# Patient Record
Sex: FEMALE | Race: OTHER | Employment: UNEMPLOYED | URBAN - METROPOLITAN AREA
[De-identification: names, ages, dates, MRNs, and addresses within clinical notes are randomized per-mention and may not be internally consistent; named-entity substitution may affect disease eponyms.]

---

## 2022-09-16 ENCOUNTER — OFFICE VISIT (OUTPATIENT)
Dept: FAMILY MEDICINE CLINIC | Age: 22
End: 2022-09-16

## 2022-09-16 VITALS
OXYGEN SATURATION: 99 % | WEIGHT: 221 LBS | BODY MASS INDEX: 47.68 KG/M2 | RESPIRATION RATE: 16 BRPM | TEMPERATURE: 97.5 F | SYSTOLIC BLOOD PRESSURE: 140 MMHG | DIASTOLIC BLOOD PRESSURE: 90 MMHG | HEART RATE: 79 BPM | HEIGHT: 57 IN

## 2022-09-16 DIAGNOSIS — I10 PRIMARY HYPERTENSION: Primary | ICD-10-CM

## 2022-09-16 DIAGNOSIS — Z60.3 IMMIGRANT WITH LANGUAGE DIFFICULTY: ICD-10-CM

## 2022-09-16 DIAGNOSIS — Z59.89 UNINSURED: ICD-10-CM

## 2022-09-16 DIAGNOSIS — E66.01 CLASS 3 SEVERE OBESITY WITHOUT SERIOUS COMORBIDITY WITH BODY MASS INDEX (BMI) OF 45.0 TO 49.9 IN ADULT, UNSPECIFIED OBESITY TYPE (HCC): ICD-10-CM

## 2022-09-16 DIAGNOSIS — R73.9 ELEVATED BLOOD SUGAR: ICD-10-CM

## 2022-09-16 DIAGNOSIS — Z59.9 FINANCIAL DIFFICULTIES: ICD-10-CM

## 2022-09-16 PROBLEM — O09.899 MATERNAL VARICELLA, NON-IMMUNE: Status: ACTIVE | Noted: 2021-12-21

## 2022-09-16 PROBLEM — O99.019 ANEMIA AFFECTING FIRST PREGNANCY: Status: ACTIVE | Noted: 2022-02-08

## 2022-09-16 PROBLEM — O98.812 CHLAMYDIA INFECTION AFFECTING PREGNANCY IN SECOND TRIMESTER: Status: ACTIVE | Noted: 2021-12-25

## 2022-09-16 PROBLEM — Z28.39 MATERNAL VARICELLA, NON-IMMUNE: Status: ACTIVE | Noted: 2021-12-21

## 2022-09-16 PROBLEM — O11.9 CHRONIC HYPERTENSION WITH SUPERIMPOSED PRE-ECLAMPSIA: Status: ACTIVE | Noted: 2021-12-21

## 2022-09-16 PROBLEM — O09.90 HIGH RISK PREGNANCY, ANTEPARTUM: Status: ACTIVE | Noted: 2021-12-28

## 2022-09-16 PROBLEM — G51.0 BELL'S PALSY: Status: ACTIVE | Noted: 2022-05-16

## 2022-09-16 PROBLEM — O99.212 OBESITY AFFECTING PREGNANCY IN SECOND TRIMESTER: Status: ACTIVE | Noted: 2022-02-03

## 2022-09-16 PROBLEM — O26.899 HEADACHE IN PREGNANCY: Status: ACTIVE | Noted: 2022-01-11

## 2022-09-16 PROBLEM — A74.9 CHLAMYDIA INFECTION AFFECTING PREGNANCY IN SECOND TRIMESTER: Status: ACTIVE | Noted: 2021-12-25

## 2022-09-16 PROBLEM — R51.9 HEADACHE IN PREGNANCY: Status: ACTIVE | Noted: 2022-01-11

## 2022-09-16 PROBLEM — O12.12 PROTEINURIA AFFECTING PREGNANCY IN SECOND TRIMESTER: Status: ACTIVE | Noted: 2022-01-31

## 2022-09-16 LAB — GLUCOSE, POC: 147 MG/DL

## 2022-09-16 PROCEDURE — 82947 ASSAY GLUCOSE BLOOD QUANT: CPT | Performed by: NURSE PRACTITIONER

## 2022-09-16 PROCEDURE — 99203 OFFICE O/P NEW LOW 30 MIN: CPT | Performed by: NURSE PRACTITIONER

## 2022-09-16 RX ORDER — LISINOPRIL 10 MG/1
10 TABLET ORAL DAILY
Qty: 30 TABLET | Refills: 0 | Status: SHIPPED | OUTPATIENT
Start: 2022-09-16 | End: 2022-10-24 | Stop reason: SDUPTHER

## 2022-09-16 RX ORDER — LABETALOL 200 MG/1
200 TABLET, FILM COATED ORAL EVERY 12 HOURS SCHEDULED
Qty: 30 TABLET | Refills: 0 | Status: SHIPPED | OUTPATIENT
Start: 2022-09-16 | End: 2022-10-24 | Stop reason: SDUPTHER

## 2022-09-16 RX ORDER — VALACYCLOVIR HYDROCHLORIDE 1 G/1
1000 TABLET, FILM COATED ORAL 3 TIMES DAILY
COMMUNITY
Start: 2022-05-21

## 2022-09-16 RX ORDER — LABETALOL 200 MG/1
400 TABLET, FILM COATED ORAL EVERY 12 HOURS SCHEDULED
COMMUNITY
Start: 2022-05-21 | End: 2022-09-16 | Stop reason: SDUPTHER

## 2022-09-16 SDOH — ECONOMIC STABILITY - INCOME SECURITY: PROBLEM RELATED TO HOUSING AND ECONOMIC CIRCUMSTANCES, UNSPECIFIED: Z59.9

## 2022-09-16 SDOH — SOCIAL STABILITY - SOCIAL INSECURITY: ACCULTURATION DIFFICULTY: Z60.3

## 2022-09-16 SDOH — ECONOMIC STABILITY - INCOME SECURITY: OTHER PROBLEMS RELATED TO HOUSING AND ECONOMIC CIRCUMSTANCES: Z59.89

## 2022-09-16 ASSESSMENT — PATIENT HEALTH QUESTIONNAIRE - PHQ9
SUM OF ALL RESPONSES TO PHQ9 QUESTIONS 1 & 2: 0
2. FEELING DOWN, DEPRESSED OR HOPELESS: 0
SUM OF ALL RESPONSES TO PHQ QUESTIONS 1-9: 0
1. LITTLE INTEREST OR PLEASURE IN DOING THINGS: 0
SUM OF ALL RESPONSES TO PHQ QUESTIONS 1-9: 0

## 2022-09-16 ASSESSMENT — ANXIETY QUESTIONNAIRES
GAD7 TOTAL SCORE: 0
4. TROUBLE RELAXING: 0
3. WORRYING TOO MUCH ABOUT DIFFERENT THINGS: 0
5. BEING SO RESTLESS THAT IT IS HARD TO SIT STILL: 0
2. NOT BEING ABLE TO STOP OR CONTROL WORRYING: 0
7. FEELING AFRAID AS IF SOMETHING AWFUL MIGHT HAPPEN: 0
1. FEELING NERVOUS, ANXIOUS, OR ON EDGE: 0
IF YOU CHECKED OFF ANY PROBLEMS ON THIS QUESTIONNAIRE, HOW DIFFICULT HAVE THESE PROBLEMS MADE IT FOR YOU TO DO YOUR WORK, TAKE CARE OF THINGS AT HOME, OR GET ALONG WITH OTHER PEOPLE: NOT DIFFICULT AT ALL
6. BECOMING EASILY ANNOYED OR IRRITABLE: 0

## 2022-09-16 ASSESSMENT — LIFESTYLE VARIABLES
HOW OFTEN DO YOU HAVE A DRINK CONTAINING ALCOHOL: NEVER
HOW MANY STANDARD DRINKS CONTAINING ALCOHOL DO YOU HAVE ON A TYPICAL DAY: PATIENT DOES NOT DRINK

## 2022-09-17 ASSESSMENT — ENCOUNTER SYMPTOMS
CHEST TIGHTNESS: 0
SHORTNESS OF BREATH: 0

## 2022-09-17 NOTE — PROGRESS NOTES
[address] [facility] (:  2000) is a 25 y.o. female,New patient, here for evaluation of the following chief complaint(s):  Hypertension (Uncontrolled BP) and Diabetes (Sugar check)        SUBJECTIVE/OBJECTIVE:    HPI:  The 25 y.o female patient Malaysian speaking English presents in [address] [place] [address] for blood pressure check and sugar check. Patient reports had baby in May 2022. During pregnancy had elevated blood pressure and was treated with Nifedipine and Labetalol. Patient continues taking this medication until ran out few months ago. Patient experience at times mild headache. Patient reports not breast feeding. Patient is obese, and said was over weight she was on [facility]. Patient reports the over weight affected the pregnancy as well as Anemia. Patient arrived with /115 and denies headache, chest pain or weakness, or sweating. Rechecked /90. Ordered BS resulted 147. We discuss new BP medication with the Labetalol taking in the evening. BP (!) 140/90   Pulse 79   Temp 97.5 °F (36.4 °C) (Temporal)   Resp 16   Ht 4' 9\" (1.448 m)   Wt 221 lb (100.2 kg)   SpO2 99%   Breastfeeding No   BMI 47.82 kg/m²     No results found for: CBC, CMP, LIPIDPAN, TSH, HBA1C     Not on File    Current Outpatient Medications   Medication Sig Dispense Refill    valACYclovir (VALTREX) 1 g tablet Take 1,000 mg by mouth 3 times daily      labetalol (NORMODYNE) 200 MG tablet Take 1 tablet by mouth every 12 hours 30 tablet 0    lisinopril (PRINIVIL;ZESTRIL) 10 MG tablet Take 1 tablet by mouth daily 30 tablet 0     No current facility-administered medications for this visit. History reviewed. No pertinent past medical history. History reviewed. No pertinent surgical history. Review of Systems   Constitutional:  Negative for fatigue. HENT:  Negative for congestion. Respiratory:  Negative for chest tightness and shortness of breath. Cardiovascular:  Negative for chest pain.      Physical Exam  Vitals reviewed. Constitutional:       Appearance: She is obese. HENT:      Head: Normocephalic. Nose: Nose normal.   Cardiovascular:      Heart sounds: Normal heart sounds. Pulmonary:      Effort: Pulmonary effort is normal.      Breath sounds: Normal breath sounds. Musculoskeletal:      Cervical back: Normal range of motion. Neurological:      Mental Status: She is alert and oriented to person, place, and time. Psychiatric:         Mood and Affect: Mood normal.         Behavior: Behavior normal.         Thought Content: Thought content normal.         Judgment: Judgment normal.         ASSESSMENT/PLAN:  Restart Labetalol 200 mg take in the evening. Start patient with Lisinopril 10 mg take in the morning. Discussed lifestyle modification, daily walking, cutting down on salt. Discussed DASH diet. I recommended patient follow up in 4 weeks for another BP refill   at this visit. The goal here is to control her BP and reduce weight. 1. Primary hypertension  -     labetalol (NORMODYNE) 200 MG tablet; Take 1 tablet by mouth every 12 hours, Disp-30 tablet, R-0Print  -     lisinopril (PRINIVIL;ZESTRIL) 10 MG tablet; Take 1 tablet by mouth daily, Disp-30 tablet, R-0Print  2. Elevated blood sugar  -     AMB POC GLUCOSE, QUANTITATIVE, BLOOD  3. Uninsured  4. Immigrant with language difficulty  5. Financial difficulties  6. Class 3 severe obesity without serious comorbidity with body mass index (BMI) of 45.0 to 49.9 in adult, unspecified obesity type ([facility]      Return in about 4 weeks (around 10/14/2022) for F/U BP. On this date 9/16/2022 I have spent 30 minutes reviewing previous notes, test results and face to face with the patient discussing the diagnosis and importance of compliance with the treatment plan as well as documenting on the day of the visit. An electronic signature was used to authenticate this note.     --Ingrid Odom, GENE - CNP

## 2022-10-24 ENCOUNTER — OFFICE VISIT (OUTPATIENT)
Dept: PRIMARY CARE CLINIC | Facility: CLINIC | Age: 22
End: 2022-10-24

## 2022-10-24 VITALS
RESPIRATION RATE: 16 BRPM | HEIGHT: 57 IN | SYSTOLIC BLOOD PRESSURE: 140 MMHG | HEART RATE: 74 BPM | DIASTOLIC BLOOD PRESSURE: 82 MMHG | WEIGHT: 228.8 LBS | OXYGEN SATURATION: 99 % | TEMPERATURE: 97.3 F | BODY MASS INDEX: 49.36 KG/M2

## 2022-10-24 DIAGNOSIS — I10 PRIMARY HYPERTENSION: ICD-10-CM

## 2022-10-24 DIAGNOSIS — R73.9 HYPERGLYCEMIA: Primary | ICD-10-CM

## 2022-10-24 PROBLEM — O09.90 HIGH RISK PREGNANCY, ANTEPARTUM: Status: RESOLVED | Noted: 2021-12-28 | Resolved: 2022-10-24

## 2022-10-24 PROBLEM — O98.812 CHLAMYDIA INFECTION AFFECTING PREGNANCY IN SECOND TRIMESTER: Status: RESOLVED | Noted: 2021-12-25 | Resolved: 2022-10-24

## 2022-10-24 PROBLEM — Z28.39 MATERNAL VARICELLA, NON-IMMUNE: Status: RESOLVED | Noted: 2021-12-21 | Resolved: 2022-10-24

## 2022-10-24 PROBLEM — O11.9 CHRONIC HYPERTENSION WITH SUPERIMPOSED PRE-ECLAMPSIA: Status: RESOLVED | Noted: 2021-12-21 | Resolved: 2022-10-24

## 2022-10-24 PROBLEM — A74.9 CHLAMYDIA INFECTION AFFECTING PREGNANCY IN SECOND TRIMESTER: Status: RESOLVED | Noted: 2021-12-25 | Resolved: 2022-10-24

## 2022-10-24 PROBLEM — O09.899 MATERNAL VARICELLA, NON-IMMUNE: Status: RESOLVED | Noted: 2021-12-21 | Resolved: 2022-10-24

## 2022-10-24 PROBLEM — G51.0 BELL'S PALSY: Status: RESOLVED | Noted: 2022-05-16 | Resolved: 2022-10-24

## 2022-10-24 PROBLEM — R51.9 HEADACHE IN PREGNANCY: Status: RESOLVED | Noted: 2022-01-11 | Resolved: 2022-10-24

## 2022-10-24 PROBLEM — O12.12 PROTEINURIA AFFECTING PREGNANCY IN SECOND TRIMESTER: Status: RESOLVED | Noted: 2022-01-31 | Resolved: 2022-10-24

## 2022-10-24 PROBLEM — O99.019 ANEMIA AFFECTING FIRST PREGNANCY: Status: RESOLVED | Noted: 2022-02-08 | Resolved: 2022-10-24

## 2022-10-24 PROBLEM — O26.899 HEADACHE IN PREGNANCY: Status: RESOLVED | Noted: 2022-01-11 | Resolved: 2022-10-24

## 2022-10-24 PROBLEM — O99.212 OBESITY AFFECTING PREGNANCY IN SECOND TRIMESTER: Status: RESOLVED | Noted: 2022-02-03 | Resolved: 2022-10-24

## 2022-10-24 PROCEDURE — 99214 OFFICE O/P EST MOD 30 MIN: CPT | Performed by: PHYSICIAN ASSISTANT

## 2022-10-24 RX ORDER — LISINOPRIL 10 MG/1
10 TABLET ORAL DAILY
Qty: 30 TABLET | Refills: 3 | Status: SHIPPED | OUTPATIENT
Start: 2022-10-24 | End: 2022-11-15 | Stop reason: HOSPADM

## 2022-10-24 RX ORDER — LABETALOL 200 MG/1
200 TABLET, FILM COATED ORAL DAILY
Qty: 30 TABLET | Refills: 3 | Status: SHIPPED | OUTPATIENT
Start: 2022-10-24 | End: 2022-11-23

## 2022-10-24 ASSESSMENT — ENCOUNTER SYMPTOMS
CONSTIPATION: 0
EYE PAIN: 0
DIARRHEA: 0
VOMITING: 0
SORE THROAT: 0
SHORTNESS OF BREATH: 0
ABDOMINAL PAIN: 0

## 2022-10-24 ASSESSMENT — PATIENT HEALTH QUESTIONNAIRE - PHQ9
1. LITTLE INTEREST OR PLEASURE IN DOING THINGS: 0
2. FEELING DOWN, DEPRESSED OR HOPELESS: 0
SUM OF ALL RESPONSES TO PHQ QUESTIONS 1-9: 0
SUM OF ALL RESPONSES TO PHQ QUESTIONS 1-9: 0
SUM OF ALL RESPONSES TO PHQ9 QUESTIONS 1 & 2: 0
SUM OF ALL RESPONSES TO PHQ QUESTIONS 1-9: 0
SUM OF ALL RESPONSES TO PHQ QUESTIONS 1-9: 0

## 2022-10-24 NOTE — PROGRESS NOTES
[address] [facility] (:  2000) is a 25 y.o. female here for evaluation of the following chief complaint(s):  Blood Pressure Check, Medication Refill, and Establish Care         ASSESSMENT/PLAN:  1. Hyperglycemia  Comments: Will check A1C before next visit   Orders:  -     Hemoglobin A1C; Future  2. Primary hypertension  Comments:  COntinue labetalol + lisinopril daily. Recheck 4 weeks. If controlled can probably d/c lisinopril and increase labetalol to BID if needed   Orders:  -     lisinopril (PRINIVIL;ZESTRIL) 10 MG tablet; Take 1 tablet by mouth daily, Disp-30 tablet, R-3Normal  -     labetalol (NORMODYNE) 200 MG tablet; Take 1 tablet by mouth daily, Disp-30 tablet, R-3Normal  -     CBC with Auto Differential; Future  -     Comprehensive Metabolic Panel; Future      No results found for any visits on 10/24/22. Return in about 1 month (around 2022) for blood pressure check and review labs , blood pressure check. Subjective   SUBJECTIVE/OBJECTIVE:  Pt here for BP recheck and med refills. Pt reports compliance with labetalol and lisinopril. Rx for labetalol says to take BID but patient says she was told to just take once per day in the evening so that is what she has been doing. She denies headaches, dizziness, visual disturbances, chest pains or SOB. She has history of gestational diabetes and no A1C has been done since she delivered her baby about 6 months ago. Blood sugar was checked last month and was 147. She is wondering if she needs diabetes screening     Medication Refill  Pertinent negatives include no abdominal pain, chest pain, chills, congestion, fatigue, fever, headaches, rash, sore throat, vomiting or weakness.      No Known Allergies  Current Outpatient Medications   Medication Sig Dispense Refill    lisinopril (PRINIVIL;ZESTRIL) 10 MG tablet Take 1 tablet by mouth daily 30 tablet 3    labetalol (NORMODYNE) 200 MG tablet Take 1 tablet by mouth daily 30 tablet 3 valACYclovir (VALTREX) 1 g tablet Take 1,000 mg by mouth 3 times daily (Patient not taking: No sig reported)       No current facility-administered medications for this visit. Past Medical History:   Diagnosis Date    Anemia affecting first pregnancy 2022    Formatting of this note might be different from the original. Prescribed iron  Prev. Taking p.o iron, ran out of prescription, refills sent 3/30 Plan CBC Q4-6w  Anemia panel completed. Patient declines Infed or infusions, will continue with oral iron BID  Last Assessment & Plan:  Formatting of this note might be different from the original. Continue PO iron    Bell's palsy 2022    Last Assessment & Plan:  Formatting of this note might be different from the original. Presented with facial droop and slurred speech  Stroke alert called and work up negative CT negative MRI Subcentimeter focus of signal abnormality / calcification in the right parietal lobe at the gray-white matter junction. Primary differential diagnosis is centered around sequelae of prior insult / infection v    Chlamydia infection affecting pregnancy in second trimester 2021    Formatting of this note might be different from the original. 21: needs rx 21: rx sent 22 Pt has still not gotten medication. States work makes it difficult to get to pharmacy. Further questioning reveals she has not yet gotten her medicaid and has been trying to call their office. Pt given [facility] card and advised she and her partner can go there for free. Reviewed increased    Chronic hypertension with superimposed pre-eclampsia 2021    Formatting of this note might be different from the original. Current regimen: Procardia XL 60 / 30 mg increased on                                  Baby  mg po daily  Serial growth @ 28w Twice weekly  testing @ 32w -plan NSTs (self-pay) Del @ 37-39w : EFW 63%, breech  Superimposed preeclampsia diagnosed  after PC 1.5 Plan delivery at 37 weeks - counseled    Last Ass    Headache in pregnancy 2022    Last Assessment & Plan:  Formatting of this note might be different from the original. Denies HA today    High risk pregnancy, antepartum 2021    Formatting of this note might be different from the original. ABO/Rh: O pos HIV/Hep B/Hep C/RPR: NR/neg Rubella imm GC/C +chlamydia, treatment sent , HELENA negative cffDNA low risk female AFP negative Glucola #1 elevated, 3hr gtt wnl Glucola #2 3hr gtt wnl GBS_______ COVID vaccine , 3/15 Flu vaccine  Tdap 3/30  Last Assessment & Plan:  Formatting of this note might be different from the o    Hypertension     Maternal varicella, non-immune 2021    Formatting of this note might be different from the original. 21: plan postpartum vaccination, pt aware  Last Assessment & Plan:  Formatting of this note might be different from the original. Plan MMR PP    Obesity affecting pregnancy in second trimester 2/3/2022    Formatting of this note might be different from the original. Pregravid BMI: 45 Growth &  testing according to [facility] guidelines  Last Assessment & Plan:  Formatting of this note might be different from the original. Pregravid BMI: 45 Growth &  testing according to [facility] guidelines     Past Surgical History:   Procedure Laterality Date     SECTION       Social History     Tobacco Use    Smoking status: Never    Smokeless tobacco: Never   Vaping Use    Vaping Use: Never used   Substance Use Topics    Alcohol use: Never    Drug use: Never      History reviewed. No pertinent family history. Review of Systems   Constitutional:  Negative for chills, fatigue, fever and unexpected weight change. HENT:  Negative for congestion and sore throat. Eyes:  Negative for pain. Respiratory:  Negative for shortness of breath. Cardiovascular:  Negative for chest pain.    Gastrointestinal:  Negative for abdominal pain, constipation, diarrhea and vomiting. Genitourinary:  Negative for dysuria. Skin:  Negative for rash. Allergic/Immunologic: Negative for immunocompromised state. Neurological:  Negative for dizziness, weakness and headaches. Objective     Vitals:    10/24/22 1015   BP: (!) 140/82   Pulse: 74   Resp: 16   Temp: 97.3 °F (36.3 °C)   SpO2: 99%       Physical Exam  Vitals reviewed. Constitutional:       Appearance: Normal appearance. HENT:      Head: Normocephalic and atraumatic. Eyes:      Extraocular Movements: Extraocular movements intact. Pupils: Pupils are equal, round, and reactive to light. Cardiovascular:      Rate and Rhythm: Normal rate and regular rhythm. Pulses: Normal pulses. Heart sounds: Normal heart sounds. Pulmonary:      Effort: Pulmonary effort is normal.      Breath sounds: Normal breath sounds. Musculoskeletal:         General: Normal range of motion. Cervical back: Normal range of motion and neck supple. Skin:     General: Skin is warm and dry. Neurological:      General: No focal deficit present. Mental Status: She is alert and oriented to person, place, and time. Psychiatric:         Mood and Affect: Mood normal.         Behavior: Behavior normal.         Judgment: Judgment normal.                An electronic signature was used to authenticate this note.     --SURESH Gibson

## 2022-11-10 DIAGNOSIS — R73.9 HYPERGLYCEMIA: ICD-10-CM

## 2022-11-10 DIAGNOSIS — I10 PRIMARY HYPERTENSION: ICD-10-CM

## 2022-11-10 LAB
ALBUMIN SERPL-MCNC: 3.5 G/DL (ref 3.5–5)
ALBUMIN/GLOB SERPL: 0.8 {RATIO} (ref 0.4–1.6)
ALP SERPL-CCNC: 81 U/L (ref 50–136)
ALT SERPL-CCNC: 130 U/L (ref 12–65)
ANION GAP SERPL CALC-SCNC: 7 MMOL/L (ref 2–11)
AST SERPL-CCNC: 83 U/L (ref 15–37)
BASOPHILS # BLD: 0.1 K/UL (ref 0–0.2)
BASOPHILS NFR BLD: 1 % (ref 0–2)
BILIRUB SERPL-MCNC: 0.3 MG/DL (ref 0.2–1.1)
BUN SERPL-MCNC: 14 MG/DL (ref 6–23)
CALCIUM SERPL-MCNC: 9.3 MG/DL (ref 8.3–10.4)
CHLORIDE SERPL-SCNC: 103 MMOL/L (ref 101–110)
CO2 SERPL-SCNC: 26 MMOL/L (ref 21–32)
CREAT SERPL-MCNC: 0.6 MG/DL (ref 0.6–1)
DIFFERENTIAL METHOD BLD: ABNORMAL
EOSINOPHIL # BLD: 0.4 K/UL (ref 0–0.8)
EOSINOPHIL NFR BLD: 6 % (ref 0.5–7.8)
ERYTHROCYTE [DISTWIDTH] IN BLOOD BY AUTOMATED COUNT: 18.7 % (ref 11.9–14.6)
EST. AVERAGE GLUCOSE BLD GHB EST-MCNC: 163 MG/DL
GLOBULIN SER CALC-MCNC: 4.6 G/DL (ref 2.8–4.5)
GLUCOSE SERPL-MCNC: 272 MG/DL (ref 65–100)
HBA1C MFR BLD: 7.3 % (ref 4.8–5.6)
HCT VFR BLD AUTO: 36.9 % (ref 35.8–46.3)
HGB BLD-MCNC: 11.8 G/DL (ref 11.7–15.4)
IMM GRANULOCYTES # BLD AUTO: 0 K/UL (ref 0–0.5)
IMM GRANULOCYTES NFR BLD AUTO: 0 % (ref 0–5)
LYMPHOCYTES # BLD: 2.6 K/UL (ref 0.5–4.6)
LYMPHOCYTES NFR BLD: 34 % (ref 13–44)
MCH RBC QN AUTO: 23.5 PG (ref 26.1–32.9)
MCHC RBC AUTO-ENTMCNC: 32 G/DL (ref 31.4–35)
MCV RBC AUTO: 73.4 FL (ref 82–102)
MONOCYTES # BLD: 0.5 K/UL (ref 0.1–1.3)
MONOCYTES NFR BLD: 6 % (ref 4–12)
NEUTS SEG # BLD: 4.1 K/UL (ref 1.7–8.2)
NEUTS SEG NFR BLD: 53 % (ref 43–78)
NRBC # BLD: 0 K/UL (ref 0–0.2)
PLATELET # BLD AUTO: 343 K/UL (ref 150–450)
PMV BLD AUTO: 10.8 FL (ref 9.4–12.3)
POTASSIUM SERPL-SCNC: 3.9 MMOL/L (ref 3.5–5.1)
PROT SERPL-MCNC: 8.1 G/DL (ref 6.3–8.2)
RBC # BLD AUTO: 5.03 M/UL (ref 4.05–5.2)
SODIUM SERPL-SCNC: 136 MMOL/L (ref 133–143)
WBC # BLD AUTO: 7.7 K/UL (ref 4.3–11.1)

## 2022-11-15 ENCOUNTER — OFFICE VISIT (OUTPATIENT)
Dept: PRIMARY CARE CLINIC | Facility: CLINIC | Age: 22
End: 2022-11-15

## 2022-11-15 VITALS
RESPIRATION RATE: 16 BRPM | HEART RATE: 76 BPM | OXYGEN SATURATION: 98 % | DIASTOLIC BLOOD PRESSURE: 68 MMHG | BODY MASS INDEX: 45.76 KG/M2 | SYSTOLIC BLOOD PRESSURE: 102 MMHG | TEMPERATURE: 97.6 F | HEIGHT: 59 IN | WEIGHT: 227 LBS

## 2022-11-15 DIAGNOSIS — I10 PRIMARY HYPERTENSION: ICD-10-CM

## 2022-11-15 DIAGNOSIS — R73.09 ELEVATED HEMOGLOBIN A1C: Primary | ICD-10-CM

## 2022-11-15 DIAGNOSIS — R74.8 ELEVATED LIVER ENZYMES: ICD-10-CM

## 2022-11-15 LAB — GLUCOSE, POC: 188 MG/DL

## 2022-11-15 PROCEDURE — 82962 GLUCOSE BLOOD TEST: CPT | Performed by: PHYSICIAN ASSISTANT

## 2022-11-15 PROCEDURE — 3074F SYST BP LT 130 MM HG: CPT | Performed by: PHYSICIAN ASSISTANT

## 2022-11-15 PROCEDURE — 99214 OFFICE O/P EST MOD 30 MIN: CPT | Performed by: PHYSICIAN ASSISTANT

## 2022-11-15 PROCEDURE — 3078F DIAST BP <80 MM HG: CPT | Performed by: PHYSICIAN ASSISTANT

## 2022-11-15 RX ORDER — BLOOD-GLUCOSE METER
1 KIT MISCELLANEOUS DAILY
Qty: 1 KIT | Refills: 0 | Status: SHIPPED | OUTPATIENT
Start: 2022-11-15

## 2022-11-15 RX ORDER — METFORMIN HYDROCHLORIDE 500 MG/1
500 TABLET, EXTENDED RELEASE ORAL
Qty: 90 TABLET | Refills: 1 | Status: SHIPPED | OUTPATIENT
Start: 2022-11-15

## 2022-11-15 SDOH — ECONOMIC STABILITY: FOOD INSECURITY: WITHIN THE PAST 12 MONTHS, YOU WORRIED THAT YOUR FOOD WOULD RUN OUT BEFORE YOU GOT MONEY TO BUY MORE.: NEVER TRUE

## 2022-11-15 SDOH — ECONOMIC STABILITY: INCOME INSECURITY: IN THE LAST 12 MONTHS, WAS THERE A TIME WHEN YOU WERE NOT ABLE TO PAY THE MORTGAGE OR RENT ON TIME?: NO

## 2022-11-15 SDOH — ECONOMIC STABILITY: HOUSING INSECURITY
IN THE LAST 12 MONTHS, WAS THERE A TIME WHEN YOU DID NOT HAVE A STEADY PLACE TO SLEEP OR SLEPT IN A SHELTER (INCLUDING NOW)?: NO

## 2022-11-15 SDOH — ECONOMIC STABILITY: FOOD INSECURITY: WITHIN THE PAST 12 MONTHS, THE FOOD YOU BOUGHT JUST DIDN'T LAST AND YOU DIDN'T HAVE MONEY TO GET MORE.: NEVER TRUE

## 2022-11-15 ASSESSMENT — ENCOUNTER SYMPTOMS
EYE PAIN: 0
CONSTIPATION: 0
VOMITING: 0
SHORTNESS OF BREATH: 0
DIARRHEA: 0
ABDOMINAL PAIN: 0
SORE THROAT: 0

## 2022-11-15 ASSESSMENT — SOCIAL DETERMINANTS OF HEALTH (SDOH): HOW HARD IS IT FOR YOU TO PAY FOR THE VERY BASICS LIKE FOOD, HOUSING, MEDICAL CARE, AND HEATING?: NOT HARD AT ALL

## 2022-11-15 ASSESSMENT — PATIENT HEALTH QUESTIONNAIRE - PHQ9
SUM OF ALL RESPONSES TO PHQ QUESTIONS 1-9: 0
1. LITTLE INTEREST OR PLEASURE IN DOING THINGS: 0
2. FEELING DOWN, DEPRESSED OR HOPELESS: 0
SUM OF ALL RESPONSES TO PHQ QUESTIONS 1-9: 0
SUM OF ALL RESPONSES TO PHQ QUESTIONS 1-9: 0
SUM OF ALL RESPONSES TO PHQ9 QUESTIONS 1 & 2: 0
SUM OF ALL RESPONSES TO PHQ QUESTIONS 1-9: 0

## 2022-11-15 NOTE — PROGRESS NOTES
[address] [facility] (:  2000) is a 25 y.o. female here for evaluation of the following chief complaint(s):  1 Month Follow-Up, Hypertension, and Discuss Labs         ASSESSMENT/PLAN:  1. Elevated hemoglobin A1c  Comments:  start metformin once daily  instructed pt on checking BS at home   recheck A1C 3 months   dicussed diet/lifestyle modifications   Orders:  -     AMB POC GLUCOSE BLOOD, BY GLUCOSE MONITORING DEVICE  -     metFORMIN (GLUCOPHAGE-XR) 500 MG extended release tablet; Take 1 tablet by mouth daily (with breakfast), Disp-90 tablet, R-1Normal  -     Lipid Panel; Future  -     glucose monitoring (FREESTYLE FREEDOM) kit; DAILY Starting Tue 11/15/2022, Disp-1 kit, R-0, Normal  2. Primary hypertension  Comments:  stop lisinopril at this time and continue labetalol once daily for now. 3. Elevated liver enzymes  Comments:  recheck CMP and check lipids before next visit   Orders:  -     Comprehensive Metabolic Panel; Future  -     Lipid Panel; Future    Results for orders placed or performed in visit on 11/15/22   AMB POC GLUCOSE BLOOD, BY GLUCOSE MONITORING DEVICE   Result Value Ref Range    Glucose,  MG/DL        Return in about 4 weeks (around 2022) for labs before visit . Subjective   SUBJECTIVE/OBJECTIVE:  Pt here today for BP follow up and discuss labs. Her BP is much improved from last visit. She is compliant with labetalol and lisinopril    A1C was elevated 7.3% on recent labs. Pt does have history of gestational diabetes but never treated with meds- just diet controlled. Liver enzymes also had some elevation and that will be rechecked before next visit.        No Known Allergies  Current Outpatient Medications   Medication Sig Dispense Refill    metFORMIN (GLUCOPHAGE-XR) 500 MG extended release tablet Take 1 tablet by mouth daily (with breakfast) 90 tablet 1    glucose monitoring (FREESTYLE FREEDOM) kit 1 kit by Does not apply route daily 1 kit 0    labetalol (NORMODYNE) 200 MG tablet Take 1 tablet by mouth daily 30 tablet 3    valACYclovir (VALTREX) 1 g tablet Take 1,000 mg by mouth 3 times daily (Patient not taking: No sig reported)       No current facility-administered medications for this visit. Past Medical History:   Diagnosis Date    Anemia affecting first pregnancy 2022    Formatting of this note might be different from the original. Prescribed iron  Prev. Taking p.o iron, ran out of prescription, refills sent 3/30 Plan CBC Q4-6w  Anemia panel completed. Patient declines Infed or infusions, will continue with oral iron BID  Last Assessment & Plan:  Formatting of this note might be different from the original. Continue PO iron    Bell's palsy 2022    Last Assessment & Plan:  Formatting of this note might be different from the original. Presented with facial droop and slurred speech  Stroke alert called and work up negative CT negative MRI Subcentimeter focus of signal abnormality / calcification in the right parietal lobe at the gray-white matter junction. Primary differential diagnosis is centered around sequelae of prior insult / infection v    Chlamydia infection affecting pregnancy in second trimester 2021    Formatting of this note might be different from the original. 21: needs rx 21: rx sent 22 Pt has still not gotten medication. States work makes it difficult to get to pharmacy. Further questioning reveals she has not yet gotten her medicaid and has been trying to call their office. Pt given [facility] card and advised she and her partner can go there for free. Reviewed increased    Chronic hypertension with superimposed pre-eclampsia 2021    Formatting of this note might be different from the original. Current regimen: Procardia XL 60 / 30 mg increased on                                  Baby  mg po daily  Serial growth @ 28w Twice weekly  testing @ 32w -plan NSTs (self-pay) Del @ 37-39w : EFW 63%, breech  Superimposed preeclampsia diagnosed  after PC 1.5 Plan delivery at 37 weeks - counseled    Last Ass    Headache in pregnancy 2022    Last Assessment & Plan:  Formatting of this note might be different from the original. Denies HA today    High risk pregnancy, antepartum 2021    Formatting of this note might be different from the original. ABO/Rh: O pos HIV/Hep B/Hep C/RPR: NR/neg Rubella imm GC/C +chlamydia, treatment sent , HELENA negative cffDNA low risk female AFP negative Glucola #1 elevated, 3hr gtt wnl Glucola #2 3hr gtt wnl GBS_______ COVID vaccine , 3/15 Flu vaccine  Tdap 3/30  Last Assessment & Plan:  Formatting of this note might be different from the o    Hypertension     Maternal varicella, non-immune 2021    Formatting of this note might be different from the original. 21: plan postpartum vaccination, pt aware  Last Assessment & Plan:  Formatting of this note might be different from the original. Plan MMR PP    Obesity affecting pregnancy in second trimester 2/3/2022    Formatting of this note might be different from the original. Pregravid BMI: 45 Growth &  testing according to [facility] guidelines  Last Assessment & Plan:  Formatting of this note might be different from the original. Pregravid BMI: 45 Growth &  testing according to [facility] guidelines     Past Surgical History:   Procedure Laterality Date     SECTION       Social History     Tobacco Use    Smoking status: Never    Smokeless tobacco: Never   Vaping Use    Vaping Use: Never used   Substance Use Topics    Alcohol use: Never    Drug use: Never      History reviewed. No pertinent family history.     Comprehensive Metabolic Panel  Order: [id number]  Status: Final result    Visible to patient: Yes (seen)    Next appt: None    Dx: Primary hypertension    2 Result Notes  Component Ref Range & Units 11/10/22 1537    Sodium 133 - 143 mmol/L 136    Potassium 3.5 - 5.1 mmol/L 3.9 Chloride 101 - 110 mmol/L 103    CO2 21 - 32 mmol/L 26    Anion Gap 2 - 11 mmol/L 7    Glucose 65 - 100 mg/dL 272 High     BUN 6 - 23 MG/DL 14    Creatinine 0.6 - 1.0 MG/DL 0.60    Est, Glom Filt Rate >60 ml/min/1.73m2 >60    Comment:    Pediatric calculator link: [employer]. org/professionals/kdoqi/gfr_calculatorped     Effective Oct 3, 2022     These results are not intended for use in patients <25years of age. eGFR results are calculated without a race factor using  the 2021 CKD-EPI equation. Careful clinical correlation is recommended, particularly when comparing to results calculated using previous equations. The CKD-EPI equation is less accurate in patients with extremes of muscle mass, extra-renal metabolism of creatinine, excessive creatine ingestion, or following therapy that affects renal tubular secretion. Calcium 8.3 - 10.4 MG/DL 9.3    Total Bilirubin 0.2 - 1.1 MG/DL 0.3    ALT 12 - 65 U/L 130 High     AST 15 - 37 U/L 83 High     Alk Phosphatase 50 - 136 U/L 81    Total Protein 6.3 - 8.2 g/dL 8.1    Albumin 3.5 - 5.0 g/dL 3.5    Globulin 2.8 - 4.5 g/dL 4.6 High     Albumin/Globulin Ratio 0.4 - 1.6   0.8    Resulting Agency  [facility]              Specimen Collected: 11/10/22 15:37 EST Last Resulted: 11/10/22 18:23 EST           Hemoglobin A1C  Order: [id number]  Status: Final result    Visible to patient: Yes (seen)    Next appt: None    Dx: Hyperglycemia    2 Result Notes    1  Topic  Component Ref Range & Units 11/10/22 1537    Hemoglobin A1C 4.8 - 5.6 % 7.3 High     eAG mg/dL 163    Comment: Reference Range   Normal: 4.8-5.6   Diabetic >=6.5%   Normal       <5.7%    Resulting Agency  [facility]              Specimen Collected: 11/10/22 15:37 EST Last Resulted: 11/10/22 20:47 EST           Review of Systems   Constitutional:  Negative for chills, fatigue, fever and unexpected weight change. HENT:  Negative for congestion and sore throat. Eyes:  Negative for pain.    Respiratory: Negative for shortness of breath. Cardiovascular:  Negative for chest pain. Gastrointestinal:  Negative for abdominal pain, constipation, diarrhea and vomiting. Endocrine: Negative for cold intolerance, heat intolerance, polydipsia, polyphagia and polyuria. Genitourinary:  Negative for dysuria. Musculoskeletal:  Negative for gait problem, joint swelling and myalgias. Skin:  Negative for rash. Allergic/Immunologic: Negative for immunocompromised state. Neurological:  Negative for dizziness, weakness, light-headedness and headaches. Objective     Vitals:    11/15/22 1017   BP: 102/68   Pulse: 76   Resp: 16   Temp: 97.6 °F (36.4 °C)   SpO2: 98%       Physical Exam  Vitals reviewed. Constitutional:       Appearance: Normal appearance. HENT:      Head: Normocephalic and atraumatic. Mouth/Throat:      Mouth: Mucous membranes are moist.      Pharynx: Oropharynx is clear. Eyes:      Extraocular Movements: Extraocular movements intact. Pupils: Pupils are equal, round, and reactive to light. Cardiovascular:      Rate and Rhythm: Normal rate and regular rhythm. Pulmonary:      Effort: Pulmonary effort is normal.      Breath sounds: Normal breath sounds. Abdominal:      General: Bowel sounds are normal.      Palpations: Abdomen is soft. Musculoskeletal:         General: Normal range of motion. Cervical back: Normal range of motion and neck supple. Skin:     General: Skin is warm and dry. Neurological:      General: No focal deficit present. Mental Status: She is alert and oriented to person, place, and time. Psychiatric:         Mood and Affect: Mood normal.         Behavior: Behavior normal.         Judgment: Judgment normal.                An electronic signature was used to authenticate this note.     --SURESH Herron

## 2022-11-15 NOTE — PATIENT INSTRUCTIONS
Please STOP LISINOPRIL at this time   Continue labetalol  Start metformin for blood sugar  Follow up in 3-4 weeks with labs before that visit

## 2022-11-22 ENCOUNTER — TELEPHONE (OUTPATIENT)
Dept: PRIMARY CARE CLINIC | Facility: CLINIC | Age: 22
End: 2022-11-22

## 2022-11-22 DIAGNOSIS — R73.09 ELEVATED HEMOGLOBIN A1C: Primary | ICD-10-CM

## 2022-11-22 RX ORDER — GLUCOSAMINE HCL/CHONDROITIN SU 500-400 MG
CAPSULE ORAL
Qty: 100 STRIP | Refills: 2 | Status: SHIPPED | OUTPATIENT
Start: 2022-11-22

## 2022-11-22 RX ORDER — LANCETS 30 GAUGE
1 EACH MISCELLANEOUS DAILY
Qty: 300 EACH | Refills: 1 | Status: SHIPPED | OUTPATIENT
Start: 2022-11-22

## 2022-11-22 NOTE — TELEPHONE ENCOUNTER
Patient called and said that she picked up her medication including her glucometer but she did not get any lancets or test strips for it. Would like to know if you can send in a prescription for both.   The device brand is Freestyle Freedom Kit